# Patient Record
Sex: FEMALE | Race: WHITE | NOT HISPANIC OR LATINO | Employment: UNEMPLOYED | URBAN - METROPOLITAN AREA
[De-identification: names, ages, dates, MRNs, and addresses within clinical notes are randomized per-mention and may not be internally consistent; named-entity substitution may affect disease eponyms.]

---

## 2018-07-09 ENCOUNTER — HOSPITAL ENCOUNTER (INPATIENT)
Facility: MEDICAL CENTER | Age: 28
LOS: 2 days | DRG: 776 | End: 2018-07-11
Attending: OBSTETRICS & GYNECOLOGY | Admitting: OBSTETRICS & GYNECOLOGY
Payer: MEDICAID

## 2018-07-09 LAB
ABO GROUP BLD: NORMAL
BASOPHILS # BLD AUTO: 0.3 % (ref 0–1.8)
BASOPHILS # BLD AUTO: 0.6 % (ref 0–1.8)
BASOPHILS # BLD: 0.04 K/UL (ref 0–0.12)
BASOPHILS # BLD: 0.08 K/UL (ref 0–0.12)
BLD GP AB SCN SERPL QL: NORMAL
EOSINOPHIL # BLD AUTO: 0.07 K/UL (ref 0–0.51)
EOSINOPHIL # BLD AUTO: 0.09 K/UL (ref 0–0.51)
EOSINOPHIL NFR BLD: 0.5 % (ref 0–6.9)
EOSINOPHIL NFR BLD: 0.8 % (ref 0–6.9)
ERYTHROCYTE [DISTWIDTH] IN BLOOD BY AUTOMATED COUNT: 40.2 FL (ref 35.9–50)
ERYTHROCYTE [DISTWIDTH] IN BLOOD BY AUTOMATED COUNT: 40.7 FL (ref 35.9–50)
HBV SURFACE AG SER QL: NEGATIVE
HCT VFR BLD AUTO: 25.8 % (ref 37–47)
HCT VFR BLD AUTO: 27.9 % (ref 37–47)
HCV AB SER QL: REACTIVE
HCV AB SER QL: REACTIVE
HGB BLD-MCNC: 7.9 G/DL (ref 12–16)
HGB BLD-MCNC: 8.4 G/DL (ref 12–16)
HIV 1+2 AB+HIV1 P24 AG SERPL QL IA: NON REACTIVE
IMM GRANULOCYTES # BLD AUTO: 0.06 K/UL (ref 0–0.11)
IMM GRANULOCYTES # BLD AUTO: 0.1 K/UL (ref 0–0.11)
IMM GRANULOCYTES NFR BLD AUTO: 0.5 % (ref 0–0.9)
IMM GRANULOCYTES NFR BLD AUTO: 0.7 % (ref 0–0.9)
LYMPHOCYTES # BLD AUTO: 1.67 K/UL (ref 1–4.8)
LYMPHOCYTES # BLD AUTO: 2.57 K/UL (ref 1–4.8)
LYMPHOCYTES NFR BLD: 12.3 % (ref 22–41)
LYMPHOCYTES NFR BLD: 21.9 % (ref 22–41)
MCH RBC QN AUTO: 20.8 PG (ref 27–33)
MCH RBC QN AUTO: 21.6 PG (ref 27–33)
MCHC RBC AUTO-ENTMCNC: 30.1 G/DL (ref 33.6–35)
MCHC RBC AUTO-ENTMCNC: 30.6 G/DL (ref 33.6–35)
MCV RBC AUTO: 69.2 FL (ref 81.4–97.8)
MCV RBC AUTO: 70.7 FL (ref 81.4–97.8)
MONOCYTES # BLD AUTO: 0.47 K/UL (ref 0–0.85)
MONOCYTES # BLD AUTO: 0.55 K/UL (ref 0–0.85)
MONOCYTES NFR BLD AUTO: 3.5 % (ref 0–13.4)
MONOCYTES NFR BLD AUTO: 4.7 % (ref 0–13.4)
NEUTROPHILS # BLD AUTO: 11.15 K/UL (ref 2–7.15)
NEUTROPHILS # BLD AUTO: 8.42 K/UL (ref 2–7.15)
NEUTROPHILS NFR BLD: 71.8 % (ref 44–72)
NEUTROPHILS NFR BLD: 82.4 % (ref 44–72)
NRBC # BLD AUTO: 0 K/UL
NRBC # BLD AUTO: 0 K/UL
NRBC BLD-RTO: 0 /100 WBC
NRBC BLD-RTO: 0 /100 WBC
PLATELET # BLD AUTO: 174 K/UL (ref 164–446)
PLATELET # BLD AUTO: 181 K/UL (ref 164–446)
PMV BLD AUTO: 10.7 FL (ref 9–12.9)
PMV BLD AUTO: 12.8 FL (ref 9–12.9)
RBC # BLD AUTO: 3.65 M/UL (ref 4.2–5.4)
RBC # BLD AUTO: 4.03 M/UL (ref 4.2–5.4)
RH BLD: NORMAL
RUBV AB SER QL: 13.3 IU/ML
TREPONEMA PALLIDUM IGG+IGM AB [PRESENCE] IN SERUM OR PLASMA BY IMMUNOASSAY: NON REACTIVE
WBC # BLD AUTO: 11.7 K/UL (ref 4.8–10.8)
WBC # BLD AUTO: 13.5 K/UL (ref 4.8–10.8)

## 2018-07-09 PROCEDURE — 86780 TREPONEMA PALLIDUM: CPT

## 2018-07-09 PROCEDURE — 700102 HCHG RX REV CODE 250 W/ 637 OVERRIDE(OP): Performed by: FAMILY MEDICINE

## 2018-07-09 PROCEDURE — A9270 NON-COVERED ITEM OR SERVICE: HCPCS | Performed by: FAMILY MEDICINE

## 2018-07-09 PROCEDURE — 87340 HEPATITIS B SURFACE AG IA: CPT

## 2018-07-09 PROCEDURE — 700111 HCHG RX REV CODE 636 W/ 250 OVERRIDE (IP)

## 2018-07-09 PROCEDURE — 87389 HIV-1 AG W/HIV-1&-2 AB AG IA: CPT

## 2018-07-09 PROCEDURE — 87522 HEPATITIS C REVRS TRNSCRPJ: CPT | Mod: 91

## 2018-07-09 PROCEDURE — 86762 RUBELLA ANTIBODY: CPT

## 2018-07-09 PROCEDURE — 700102 HCHG RX REV CODE 250 W/ 637 OVERRIDE(OP): Performed by: OBSTETRICS & GYNECOLOGY

## 2018-07-09 PROCEDURE — A9270 NON-COVERED ITEM OR SERVICE: HCPCS | Performed by: OBSTETRICS & GYNECOLOGY

## 2018-07-09 PROCEDURE — 0HQ9XZZ REPAIR PERINEUM SKIN, EXTERNAL APPROACH: ICD-10-PCS | Performed by: OBSTETRICS & GYNECOLOGY

## 2018-07-09 PROCEDURE — 85025 COMPLETE CBC W/AUTO DIFF WBC: CPT

## 2018-07-09 PROCEDURE — 36415 COLL VENOUS BLD VENIPUNCTURE: CPT

## 2018-07-09 PROCEDURE — 770002 HCHG ROOM/CARE - OB PRIVATE (112)

## 2018-07-09 PROCEDURE — 88307 TISSUE EXAM BY PATHOLOGIST: CPT

## 2018-07-09 PROCEDURE — 86803 HEPATITIS C AB TEST: CPT | Mod: 91

## 2018-07-09 RX ORDER — LIDOCAINE HYDROCHLORIDE 10 MG/ML
INJECTION, SOLUTION EPIDURAL; INFILTRATION; INTRACAUDAL; PERINEURAL
Status: COMPLETED
Start: 2018-07-09 | End: 2018-07-09

## 2018-07-09 RX ORDER — IBUPROFEN 600 MG/1
600 TABLET ORAL EVERY 6 HOURS PRN
Status: DISCONTINUED | OUTPATIENT
Start: 2018-07-09 | End: 2018-07-11 | Stop reason: HOSPADM

## 2018-07-09 RX ORDER — METHADONE HYDROCHLORIDE 10 MG/ML
112.5 CONCENTRATE ORAL DAILY
Status: DISCONTINUED | OUTPATIENT
Start: 2018-07-09 | End: 2018-07-11 | Stop reason: HOSPADM

## 2018-07-09 RX ORDER — SODIUM CHLORIDE, SODIUM LACTATE, POTASSIUM CHLORIDE, CALCIUM CHLORIDE 600; 310; 30; 20 MG/100ML; MG/100ML; MG/100ML; MG/100ML
INJECTION, SOLUTION INTRAVENOUS PRN
Status: DISCONTINUED | OUTPATIENT
Start: 2018-07-09 | End: 2018-07-11 | Stop reason: HOSPADM

## 2018-07-09 RX ORDER — VITAMIN A ACETATE, BETA CAROTENE, ASCORBIC ACID, CHOLECALCIFEROL, .ALPHA.-TOCOPHEROL ACETATE, DL-, THIAMINE MONONITRATE, RIBOFLAVIN, NIACINAMIDE, PYRIDOXINE HYDROCHLORIDE, FOLIC ACID, CYANOCOBALAMIN, CALCIUM CARBONATE, FERROUS FUMARATE, ZINC OXIDE, CUPRIC OXIDE 3080; 12; 120; 400; 1; 1.84; 3; 20; 22; 920; 25; 200; 27; 10; 2 [IU]/1; UG/1; MG/1; [IU]/1; MG/1; MG/1; MG/1; MG/1; MG/1; [IU]/1; MG/1; MG/1; MG/1; MG/1; MG/1
1 TABLET, FILM COATED ORAL EVERY MORNING
Status: DISCONTINUED | OUTPATIENT
Start: 2018-07-09 | End: 2018-07-11 | Stop reason: HOSPADM

## 2018-07-09 RX ORDER — MISOPROSTOL 200 UG/1
600 TABLET ORAL
Status: DISCONTINUED | OUTPATIENT
Start: 2018-07-09 | End: 2018-07-11 | Stop reason: HOSPADM

## 2018-07-09 RX ORDER — DOCUSATE SODIUM 100 MG/1
100 CAPSULE, LIQUID FILLED ORAL 2 TIMES DAILY PRN
Status: DISCONTINUED | OUTPATIENT
Start: 2018-07-09 | End: 2018-07-11 | Stop reason: HOSPADM

## 2018-07-09 RX ORDER — METHADONE HYDROCHLORIDE 5 MG/1
113 TABLET ORAL DAILY
Status: DISCONTINUED | OUTPATIENT
Start: 2018-07-09 | End: 2018-07-09

## 2018-07-09 RX ADMIN — Medication 1 TABLET: at 11:45

## 2018-07-09 RX ADMIN — LIDOCAINE HYDROCHLORIDE 30 ML: 10 INJECTION, SOLUTION EPIDURAL; INFILTRATION; INTRACAUDAL; PERINEURAL at 09:39

## 2018-07-09 RX ADMIN — IBUPROFEN 600 MG: 600 TABLET, FILM COATED ORAL at 11:44

## 2018-07-09 RX ADMIN — METHADONE HYDROCHLORIDE 112.5 MG: 10 CONCENTRATE ORAL at 10:49

## 2018-07-09 ASSESSMENT — PAIN SCALES - GENERAL
PAINLEVEL_OUTOF10: 2
PAINLEVEL_OUTOF10: 6
PAINLEVEL_OUTOF10: 6
PAINLEVEL_OUTOF10: 2

## 2018-07-09 NOTE — CARE PLAN
Problem: Safety  Goal: Will remain free from injury  Pt educated to use the call light when needing assistance, pt confirmed understanding.     Problem: Venous Thromboembolism (VTW)/Deep Vein Thrombosis (DVT) Prevention:  Goal: Patient will participate in Venous Thrombosis (VTE)/Deep Vein Thrombosis (DVT)Prevention Measures  Pt ambulates.

## 2018-07-09 NOTE — DISCHARGE PLANNING
Anticipated Discharge Disposition: Unknown     Action: LSW received  Referral Form stating Dakota Bartlett with CPS called because he heard MOB gave birth and may be positive for drugs. LSW called Dakota Bartlett, PH: [phone]. CPS taking report and will get it assigned to worker. Infant tested positive for opiates, methadone, and amphetamines. LSW will call CPS after assessment is completed with additional information.    Barriers to Discharge: Waiting for case to be assigned to CPS SW to confirm discharge.    Plan: Assessment on MOB, continue contact with CPS.

## 2018-07-09 NOTE — PROGRESS NOTES
Pt arrived to unit at 1106 via wheelchair.  VSS.  Assessment complete. No signs of distress noted at this time.  Pt reports pain. Resident called and ibuprofen ordered.  Fall precautions and appropriate signs in place. Pt educated regarding fall precautions.  Pt oriented to unit routine, call light/phone system and RN extension number provided.  Pt denies any additional needs at this time.  Call light within reach. Will continue to monitor.

## 2018-07-09 NOTE — PROGRESS NOTES
"0805 - Pt arrived to L&D via [facility] to [address]. Pt states she is a G4, now P3 who didn't know she was pregnant. She states that she \"thought she peed herself\" this am around 0500. Then, at 0600, she started having cramps. At 0704, she realized that she was in labor and called [facility]. The Pt then delivered the baby on her bed at approx 0721, and directly after, [facility] arrived. Per [facility], delivery time of placenta was 0750. Upon arrival, fundus firm at 2 below U, scant bleeding. Vitals stable.   0900 - Dr. Mancini and Dr. Barrett at bedside to assess pt. 1st degree, repaired.   1030 - Pt eating breakfast.   1100 - Pt up to bathroom with steady gait, + void.   1105 - Pt transferred to S [address] via wheelchair in stable condition, infant in arms. Report to ARANZA Souza  "

## 2018-07-09 NOTE — H&P
History and Physical    Jahaira Segundo is a 28 y.o. female  at Unknown who presents for evaluation after home delivery.  No prenatal care.  Reports she didn't know she was pregnant.  Had LOF around 0500, severe abd pain after that, with delivery of male infant at home at 0721.  Placenta delivery in ambulance en route to hospital.  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  Denies any recent use of drugs (2018).      ROS: Pertinent positives documented in HPI and all other systems reviewed & are negative    OB history    .  2 prior SVDs, no issues    PGYNHx: H/O Hep C exposure, h/o chlamydia    Past Medical History:   Diagnosis Date   • ASTHMA    • Seizure (HCC)    • Substance abuse     Past Hx of Heroin       History reviewed. No pertinent surgical history.    No current facility-administered medications for this encounter.   MEDS: Methadone 113 mg PO QD    Allergies: Sulfa drugs    Social History     Social History   • Marital status: Single     Spouse name: N/A   • Number of children: N/A   • Years of education: N/A     Occupational History   • Not on file.     Social History Main Topics   • Smoking status: Current Every Day Smoker     Packs/day: 0.50     Types: Cigarettes     Last attempt to quit: 2016   • Smokeless tobacco: Never Used      Comment: 1/4 PK DAY   • Alcohol use No   • Drug use: Yes     Types: Intravenous      Comment: heroin; none since found out she was pregnant   • Sexual activity: Yes     Partners: Male     Other Topics Concern   • Not on file     Social History Narrative   • No narrative on file         FamHx: DM    Prenatal care NONE:  Patient Active Problem List    Diagnosis Date Noted   • Labor and delivery indication for care or intervention 2016   • Supervision of normal pregnancy in second trimester 2016   • History of recreational drug use- heroin and marijuana. [facility] for [facility]adone maintenance - 85mg daily on   " 07/28/2016         Objective:      Blood pressure 137/90, pulse 96, temperature 36.7 °C (98.1 °F), temperature source Temporal, resp. rate 16, height 1.6 m (5' 3\"), weight 49.9 kg (110 lb), unknown if currently breastfeeding.    General:   cooperative   Skin:   normal and track marks on UE   HEENT:  Neck supple with midline trachea   Lungs:   Normal exam   Heart:   chest is clear without rales or wheezing, no pedal edema, S1, S2 normal, no murmur, regular rate and rhythm   Abdomen:   soft, gravid, NT   Pelvis:  adequate with gynecoid pelvis.  1st degree laceration repaired at bedside with 2-0 and 3-0 vicryl sutures.  1% lidocaine used for anesthesia     Lab Review  Lab:   Blood type: AB     No results found for this or any previous visit (from the past 5880 hour(s)).     Assessment:   Jahaira Segundo at Unknown  Labor status: S/P delivery at home    Obstetrical history significant for   Patient Active Problem List    Diagnosis Date Noted   • Labor and delivery indication for care or intervention 11/19/2016   • Supervision of normal pregnancy in second trimester 07/28/2016   • History of recreational drug use- heroin and marijuana. [facility] for [facility]adone [facility] - 85mg daily on 8/25 07/28/2016   .      Plan:     Admit to L&D  GBS unknown    Repair performed  UDS  Hep C  OB labs  Peds aware of drug use  Placenta to pathology               "

## 2018-07-10 LAB
ALBUMIN SERPL BCP-MCNC: 2.8 G/DL (ref 3.2–4.9)
ALP SERPL-CCNC: 201 U/L (ref 30–99)
ALT SERPL-CCNC: 11 U/L (ref 2–50)
AMPHET UR QL SCN: POSITIVE
AST SERPL-CCNC: 24 U/L (ref 12–45)
BARBITURATES UR QL SCN: NEGATIVE
BENZODIAZ UR QL SCN: NEGATIVE
BILIRUB CONJ SERPL-MCNC: 0.1 MG/DL (ref 0.1–0.5)
BILIRUB INDIRECT SERPL-MCNC: 0.3 MG/DL (ref 0–1)
BILIRUB SERPL-MCNC: 0.4 MG/DL (ref 0.1–1.5)
BZE UR QL SCN: NEGATIVE
CANNABINOIDS UR QL SCN: NEGATIVE
METHADONE UR QL SCN: POSITIVE
OPIATES UR QL SCN: POSITIVE
OXYCODONE UR QL SCN: NEGATIVE
PCP UR QL SCN: NEGATIVE
PROPOXYPH UR QL SCN: NEGATIVE
PROT SERPL-MCNC: 5.7 G/DL (ref 6–8.2)

## 2018-07-10 PROCEDURE — A9270 NON-COVERED ITEM OR SERVICE: HCPCS | Performed by: OBSTETRICS & GYNECOLOGY

## 2018-07-10 PROCEDURE — 770002 HCHG ROOM/CARE - OB PRIVATE (112)

## 2018-07-10 PROCEDURE — 36415 COLL VENOUS BLD VENIPUNCTURE: CPT

## 2018-07-10 PROCEDURE — 700102 HCHG RX REV CODE 250 W/ 637 OVERRIDE(OP): Performed by: FAMILY MEDICINE

## 2018-07-10 PROCEDURE — 700112 HCHG RX REV CODE 229: Performed by: OBSTETRICS & GYNECOLOGY

## 2018-07-10 PROCEDURE — 80307 DRUG TEST PRSMV CHEM ANLYZR: CPT

## 2018-07-10 PROCEDURE — 80076 HEPATIC FUNCTION PANEL: CPT

## 2018-07-10 PROCEDURE — 700102 HCHG RX REV CODE 250 W/ 637 OVERRIDE(OP): Performed by: OBSTETRICS & GYNECOLOGY

## 2018-07-10 PROCEDURE — A9270 NON-COVERED ITEM OR SERVICE: HCPCS | Performed by: NURSE PRACTITIONER

## 2018-07-10 PROCEDURE — A9270 NON-COVERED ITEM OR SERVICE: HCPCS | Performed by: FAMILY MEDICINE

## 2018-07-10 PROCEDURE — 700102 HCHG RX REV CODE 250 W/ 637 OVERRIDE(OP): Performed by: NURSE PRACTITIONER

## 2018-07-10 RX ORDER — FERROUS SULFATE 325(65) MG
325 TABLET ORAL 2 TIMES DAILY WITH MEALS
Status: DISCONTINUED | OUTPATIENT
Start: 2018-07-10 | End: 2018-07-11 | Stop reason: HOSPADM

## 2018-07-10 RX ADMIN — IBUPROFEN 600 MG: 600 TABLET, FILM COATED ORAL at 08:21

## 2018-07-10 RX ADMIN — METHADONE HYDROCHLORIDE 112.5 MG: 10 CONCENTRATE ORAL at 08:47

## 2018-07-10 RX ADMIN — IBUPROFEN 600 MG: 600 TABLET, FILM COATED ORAL at 17:18

## 2018-07-10 RX ADMIN — FERROUS SULFATE TAB 325 MG (65 MG ELEMENTAL FE) 325 MG: 325 (65 FE) TAB at 17:18

## 2018-07-10 RX ADMIN — Medication 1 TABLET: at 08:21

## 2018-07-10 RX ADMIN — FERROUS SULFATE TAB 325 MG (65 MG ELEMENTAL FE) 325 MG: 325 (65 FE) TAB at 08:21

## 2018-07-10 RX ADMIN — DOCUSATE SODIUM 100 MG: 100 CAPSULE, LIQUID FILLED ORAL at 08:21

## 2018-07-10 ASSESSMENT — PAIN SCALES - GENERAL
PAINLEVEL_OUTOF10: 6
PAINLEVEL_OUTOF10: 2
PAINLEVEL_OUTOF10: 2
PAINLEVEL_OUTOF10: 6

## 2018-07-10 NOTE — DISCHARGE PLANNING
"Discharge Planning Assessment Post Partum    Reason for Referral: Infant tested positive for drugs, no prenatal care  Address: [address] [place] [address], [place] [address]   Type of Living Situation: MOB, child and FOB live with MOB parents  Mom Diagnosis: Pregnancy   Baby Diagnosis: Birth  Primary Language: English    Name of Baby: Saturnino Corrigan  Father of the Baby: Eusebio Corrigan  Involved in baby’s care? Yes  Contact Information: [phone]  FOB works as an \"h-vac technician\" at [employer]    Prenatal Care: None, MOB stated she did not know she was pregnant  Mom's PCP: None  PCP for new baby: Dr. Giraldo    Support System: MOB and FOB parents, siblings  Coping/Bonding between mother & baby: Yes  Source of Feeding: Formula  Supplies for Infant: Prepared, MOB stated grandparents are getting what supplies they do not have. Already had crib, car seat, clothing, from prior child.    Mom's Insurance: PFA working on Medicaid  Baby Covered on Insurance: None  Mother Employed/School: None  Other children in the home/names & ages: 1 1/5 year old - Rodolfo Corrigan    Financial Hardship/Income: none   Mom's Mental status: A&OX4  Services used prior to admit: None    CPS History: MOB stated voluntary case last year  Psychiatric History: DX Depression in high school  Domestic Violence History: none  Drug/ETOH History: MOB stated she is going to methadone clinic and has not done drugs for a few months. LSW informed MOB infant tested positive for drugs. MOB stated she was ashamed of using but she didn't know she was pregnant. MOB stated she had relapsed \"last weekend\".    Resources Provided: None  Referrals Made: None     Clearance for Discharge: Discharge clearance pending CPS investigation.    Ongoing Plan: LSW met with MOB at bedside. During assessment, CPS SW, Alena Colon, entered room. LSW to return to provide resources and additional support post interview with CPS.       "

## 2018-07-10 NOTE — PROGRESS NOTES
Assumed care of patient at 1915, received report from day RN at that time. Communication board updated and reviewed with pt. Assessment complete. Pt denies pain at this time. No other needs at this time. Call light and personal belongings within reach.

## 2018-07-10 NOTE — CARE PLAN
Problem: Altered physiologic condition related to immediate post-delivery state and potential for bleeding/hemorrhage  Goal: Patient physiologically stable as evidenced by normal lochia, palpable uterine involution and vital signs within normal limits  Outcome: PROGRESSING AS EXPECTED  Fundus firm, lochia light.     Problem: Alteration in comfort related to episiotomy, vaginal repair and/or after birth pains  Goal: Patient is able to ambulate, care for self and infant  Outcome: PROGRESSING AS EXPECTED  Pt able to ambulate and care for self and infant.

## 2018-07-10 NOTE — CARE PLAN
Problem: Discharge Barriers/Planning  Goal: Patient's continuum of care needs will be met  Awaiting a urine sample from pt.     Problem: Pain Management  Goal: Pain level will decrease to patient's comfort goal  Will medicate for pain PRN see MAR

## 2018-07-10 NOTE — PROGRESS NOTES
0701--Received report from night RN. Infant at bedside in open crib no signs of distress. Pt resting in bed, discussed plan of care and pain management for the day. Pt states she will call staff if she requires pain interventions or assistance through the day. PRN medication administered per MAR. No further needs at this time.

## 2018-07-10 NOTE — PROGRESS NOTES
"Post Partum Progress Note    Name:   Jahaira Segundo   Date/Time:  7/10/2018 - 6:40 AM  Chief Admitting Dx:  Pregnancy  Labor and delivery indication for care or intervention  Delivery Type:  vaginal, spontaneous  Post-Op/Post Partum Days #:  1    Subjective:  Abdominal pain: no  Ambulating:   yes  Tolerating liquids:  yes  Tolerating food:  yes common adult  Flatus:   yes  BM:    no  Bleeding:   with a small amount of bleeding  Voiding:   yes  Dizziness:   no  Feeding:   bottle - Similac with iron    Vitals:    07/09/18 0813 07/09/18 1230 07/09/18 1600 07/09/18 2000   BP: 137/90 116/83 118/79 124/93   Pulse: 96 86 79 88   Resp: 16 18 18 18   Temp: 36.7 °C (98.1 °F) 36.6 °C (97.8 °F) 36.7 °C (98 °F) 36.7 °C (98.1 °F)   TempSrc: Temporal      SpO2:  94% 96% 98%   Weight: 49.9 kg (110 lb)      Height: 1.6 m (5' 3\")          Exam:  Breast: Lactating yes  Abdomen: Abdomen soft, non-tender. BS normal. No masses,  No organomegaly  Fundal Tenderness:  no  Fundus Firm: yes  Incision: none  Below umbilicus: yes  Perineum: perineum intact  Lochia: mild  Extremities: Normal extremities, peripheral pulses and reflexes normal, no edema, redness or tenderness in the calves or thighs    Meds:  Current Facility-Administered Medications   Medication Dose   • LR infusion     • miSOPROStol (CYTOTEC) tablet 600 mcg  600 mcg   • docusate sodium (COLACE) capsule 100 mg  100 mg   • prenatal plus vitamin (STUARTNATAL 1+1) 27-1 MG tablet 1 Tab  1 Tab   • methadone (DOLOPHINE) 10 MG/ML solution 112.5 mg  112.5 mg   • ibuprofen (MOTRIN) tablet 600 mg  600 mg       Labs:   Recent Labs      07/09/18   0944  07/09/18   1551   WBC  13.5*  11.7*   RBC  4.03*  3.65*   HEMOGLOBIN  8.4*  7.9*   HEMATOCRIT  27.9*  25.8*   MCV  69.2*  70.7*   MCH  20.8*  21.6*   MCHC  30.1*  30.6*   RDW  40.2  40.7   PLATELETCT  174  181   MPV  10.7  12.8       Assessment:  Chief Admitting Dx:  Pregnancy  Labor and delivery indication for care or " intervention  Delivery Type:  vaginal, spontaneous  Tubal Ligation:  no    Plan:  Continue routine post partum care.  +Hep C - will order LFTs.   Asx anemia - will start iron  Anticipate DC home on PPD#2    Josey Lindsay C.N.M.

## 2018-07-11 VITALS
HEART RATE: 100 BPM | BODY MASS INDEX: 19.49 KG/M2 | SYSTOLIC BLOOD PRESSURE: 142 MMHG | RESPIRATION RATE: 14 BRPM | WEIGHT: 110 LBS | TEMPERATURE: 98.6 F | DIASTOLIC BLOOD PRESSURE: 74 MMHG | OXYGEN SATURATION: 96 % | HEIGHT: 63 IN

## 2018-07-11 LAB
HCV RNA SERPL NAA+PROBE-ACNC: ABNORMAL IU/ML
HCV RNA SERPL NAA+PROBE-ACNC: ABNORMAL IU/ML
HCV RNA SERPL NAA+PROBE-LOG IU: 6.2 LOG IU
HCV RNA SERPL NAA+PROBE-LOG IU: 6.3 LOG IU
HCV RNA SERPL QL NAA+PROBE: DETECTED
HCV RNA SERPL QL NAA+PROBE: DETECTED
PATHOLOGY STUDY: ABNORMAL
PATHOLOGY STUDY: ABNORMAL

## 2018-07-11 PROCEDURE — 700102 HCHG RX REV CODE 250 W/ 637 OVERRIDE(OP): Performed by: OBSTETRICS & GYNECOLOGY

## 2018-07-11 PROCEDURE — 700102 HCHG RX REV CODE 250 W/ 637 OVERRIDE(OP): Performed by: FAMILY MEDICINE

## 2018-07-11 PROCEDURE — 700102 HCHG RX REV CODE 250 W/ 637 OVERRIDE(OP): Performed by: NURSE PRACTITIONER

## 2018-07-11 PROCEDURE — 700112 HCHG RX REV CODE 229: Performed by: OBSTETRICS & GYNECOLOGY

## 2018-07-11 PROCEDURE — A9270 NON-COVERED ITEM OR SERVICE: HCPCS | Performed by: FAMILY MEDICINE

## 2018-07-11 PROCEDURE — A9270 NON-COVERED ITEM OR SERVICE: HCPCS | Performed by: NURSE PRACTITIONER

## 2018-07-11 PROCEDURE — A9270 NON-COVERED ITEM OR SERVICE: HCPCS | Performed by: OBSTETRICS & GYNECOLOGY

## 2018-07-11 RX ORDER — IBUPROFEN 600 MG/1
600 TABLET ORAL EVERY 6 HOURS PRN
Qty: 30 TAB | Refills: 0 | Status: SHIPPED | OUTPATIENT
Start: 2018-07-11

## 2018-07-11 RX ORDER — FERROUS SULFATE 325(65) MG
325 TABLET ORAL 2 TIMES DAILY WITH MEALS
Qty: 30 TAB | Refills: 1 | Status: SHIPPED | OUTPATIENT
Start: 2018-07-11

## 2018-07-11 RX ADMIN — IBUPROFEN 600 MG: 600 TABLET, FILM COATED ORAL at 08:08

## 2018-07-11 RX ADMIN — METHADONE HYDROCHLORIDE 112.5 MG: 10 CONCENTRATE ORAL at 09:52

## 2018-07-11 RX ADMIN — DOCUSATE SODIUM 100 MG: 100 CAPSULE, LIQUID FILLED ORAL at 08:08

## 2018-07-11 RX ADMIN — FERROUS SULFATE TAB 325 MG (65 MG ELEMENTAL FE) 325 MG: 325 (65 FE) TAB at 08:08

## 2018-07-11 RX ADMIN — Medication 1 TABLET: at 08:08

## 2018-07-11 ASSESSMENT — PAIN SCALES - GENERAL
PAINLEVEL_OUTOF10: 0
PAINLEVEL_OUTOF10: 6
PAINLEVEL_OUTOF10: 5
PAINLEVEL_OUTOF10: 0

## 2018-07-11 NOTE — CARE PLAN
Problem: Communication  Goal: The ability to communicate needs accurately and effectively will improve  Outcome: PROGRESSING AS EXPECTED  Infant in Nursery (monitored), MOB hasn't visited infant and has requested staff to bottle feed infant. Patient has been ambulating well, taking adequate PO fluids and voiding. Refused Pneumonia vaccine.     Problem: Pain Management  Goal: Pain level will decrease to patient's comfort goal  Outcome: PROGRESSING AS EXPECTED  Patient likes to call for PRN pain medication when needed.

## 2018-07-11 NOTE — DISCHARGE SUMMARY
Discharge Summary:      Jahaira Segundo      Admit Date:   2018  Discharge Date:  2018     Admitting diagnosis:  Pregnancy  Labor and delivery indication for care or intervention  Discharge Diagnosis: Status post vaginal, spontaneous.  Pregnancy Complications: no prenatal care, home delivery, methadone use  Tubal Ligation:  no        History:  Past Medical History:   Diagnosis Date   • ASTHMA    • Seizure (HCC)    • Substance abuse     Past Hx of Heroin     OB History    Para Term  AB Living   3 1 1     1   SAB TAB Ectopic Molar Multiple Live Births             1      # Outcome Date GA Lbr Uri/2nd Weight Sex Delivery Anes PTL Lv   3 Current            2 Term 09 41w0d  3.402 kg (7 lb 8 oz) M Vag-Spont  N TANMAY      Birth Comments: Induced   1                     Sulfa drugs  Patient Active Problem List    Diagnosis Date Noted   • Labor and delivery indication for care or intervention 2016   • Supervision of normal pregnancy in second trimester 2016   • History of recreational drug use- heroin and marijuana. [facility]one [facility] - 85mg daily on 2016        Hospital Course:   28 y.o. , now para 2, was admitted with the above mentioned diagnosis, underwent Active Labor, vaginal, spontaneous at home. Patient postpartum course was unremarkable, with progressive advancement in diet , ambulation and toleration of oral analgesia. Patient without complaints today and desires discharge.      Vitals:    18 1600 18 2000 07/10/18 0800 07/10/18 2000   BP: 118/79 124/93 119/88 116/82   Pulse: 79 88 85 75   Resp:    Temp: 36.7 °C (98 °F) 36.7 °C (98.1 °F) 36.6 °C (97.8 °F) 36.8 °C (98.3 °F)   TempSrc:       SpO2: 96% 98% 95% 96%   Weight:       Height:           Current Facility-Administered Medications   Medication Dose   • ferrous sulfate tablet 325 mg  325 mg   • LR infusion     • miSOPROStol (CYTOTEC) tablet  600 mcg  600 mcg   • docusate sodium (COLACE) capsule 100 mg  100 mg   • prenatal plus vitamin (STUARTNATAL 1+1) 27-1 MG tablet 1 Tab  1 Tab   • methadone (DOLOPHINE) 10 MG/ML solution 112.5 mg  112.5 mg   • ibuprofen (MOTRIN) tablet 600 mg  600 mg       Exam:  Breast Exam: negative  Abdomen: Abdomen soft, non-tender. BS normal. No masses,  No organomegaly  Fundus Non Tender: yes  Incision: none  Perineum: perineum intact  Extremity: extremities, peripheral pulses and reflexes normal     Labs:  Recent Labs      07/09/18   0944  07/09/18   1551   WBC  13.5*  11.7*   RBC  4.03*  3.65*   HEMOGLOBIN  8.4*  7.9*   HEMATOCRIT  27.9*  25.8*   MCV  69.2*  70.7*   MCH  20.8*  21.6*   MCHC  30.1*  30.6*   RDW  40.2  40.7   PLATELETCT  174  181   MPV  10.7  12.8        Activity:   Discharge to home  Pelvic Rest x 6 weeks    Assessment:  normal postpartum course  Discharge Assessment: No areas of skin breakdown/redness; surgical incision intact/healing     Follow up: [facility] or [facility] in 5 weeks for vaginal. PO FeSO4 for anemia.      Discharge Meds:   Current Outpatient Prescriptions   Medication Sig Dispense Refill   • ibuprofen (MOTRIN) 600 MG Tab Take 1 Tab by mouth every 6 hours as needed for Mild Pain or Moderate Pain. 30 Tab 0   • ferrous sulfate 325 (65 Fe) MG tablet Take 1 Tab by mouth 2 times a day, with meals. 30 Tab 1       ONDINA Griffin.

## 2018-07-11 NOTE — CARE PLAN
Problem: Altered physiologic condition related to immediate post-delivery state and potential for bleeding/hemorrhage  Goal: Patient physiologically stable as evidenced by normal lochia, palpable uterine involution and vital signs within normal limits  Outcome: PROGRESSING AS EXPECTED  Fundus firm, lochia light.     Problem: Alteration in comfort related to episiotomy, vaginal repair and/or after birth pains  Goal: Patient is able to ambulate, care for self and infant  Outcome: PROGRESSING AS EXPECTED  PRN medication administered per MAR.

## 2018-07-11 NOTE — PROGRESS NOTES
See charting in paper chart, due to system downtime.    Pt discharged at approximately 1300 via wheelchair with hospital escort. Infant staying in NBN under care of NBN RN.  Pt discharge instructions provided at approximately 1200 no prescriptions ordered by MD. No further questions at this time.

## 2018-07-11 NOTE — DISCHARGE INSTRUCTIONS
POSTPARTUM DISCHARGE INSTRUCTIONS FOR MOM    YOB: 1990   Age: 28 y.o.               Admit Date: 2018     Discharge Date: 2018  Attending Doctor:  Mare Ba M.D.                  Allergies:  Sulfa drugs    Discharged to home by car. Discharged via wheelchair, hospital escort: Yes.  Special equipment needed: Not Applicable  Belongings with: Personal  Be sure to schedule a follow-up appointment with your primary care doctor or any specialists as instructed.     Discharge Plan:   Activity Level: Discussed  Confirmed Follow up Appointment: Patient to Call and Schedule Appointment  Confirmed Symptoms Management: Discussed  Medication Reconciliation Updated: Yes  Influenza Vaccine Indication: Patient Refuses    REASONS TO CALL YOUR OBSTETRICIAN:  1.   Persistent fever or shaking chills (Temperature higher than 100.4)  2.   Heavy bleeding (soaking more than 1 pad per hour); Passing clots  3.   Foul odor from vagina  4.   Mastitis (Breast infection; breast pain, chills, fever, redness)  5.   Urinary pain, burning or frequency  6.   Episiotomy infection  7.   Abdominal incision infection  8.   Severe depression longer than 24 hours    HAND WASHING  · Prior to handling the baby.  · Before breastfeeding or bottle feeding baby.  · After using the bathroom or changing the baby's diaper.    WOUND CARE  Ask your physician for additional care instructions.  In general:    ·  Incision:      · Keep clean and dry.    · Do NOT lift anything heavier than your baby for up to 6 weeks.    · There should not be any opening or pus.      VAGINAL CARE  · Nothing inside vagina for 6 weeks: no sexual intercourse, tampons or douching.  · Bleeding may continue for 2-4 weeks.  Amount may vary.    · Call your physician for heavy bleeding which means soaking more than 1 pad per hour    BIRTH CONTROL  · It is possible to become pregnant at any time after delivery and while breastfeeding.  · Plan to discuss a method  "of birth control with your physician at your follow up visit. visit.    DIET AND ELIMINATION  · Eating more fiber (bran cereal, fruits, and vegetables) and drinking plenty of fluids will help to avoid constipation.  · Urinary frequency after childbirth is normal.    POSTPARTUM BLUES  During the first few days after birth, you may experience a sense of the \"blues\" which may include impatience, irritability or even crying.  These feeling come and go quickly.  However, as many as 1 in 10 women experience emotional symptoms known as postpartum depression.    Postpartum depression:  May start as early as the second or third day after delivery or take several weeks or months to develop.  Symptoms of \"blues\" are present, but are more intense:  Crying spells; loss of appetite; feelings of hopelessness or loss of control; fear of touching the baby; over concern or no concern at all about the baby; little or no concern about your own appearance/caring for yourself; and/or inability to sleep or excessive sleeping.  Contact your physician if you are experiencing any of these symptoms.    Crisis Hotline:  · [place] Crisis Hotline:  8-743-BLAIVXY  Or 1-456.936.7710  · [place] Crisis Hotline:  1-950.927.3826  Or 576-807-8951    PREVENTING SHAKEN BABY:  If you are angry or stressed, PUT THE BABY IN THE CRIB, step away, take some deep breaths, and wait until you are calm to care for the baby.  DO NOT SHAKE THE BABY.  You are not alone, call a supporter for help.    · Crisis Call Center 24/7 crisis line 777-041-5759 or 1-244.563.7248  · You can also text them, text \"ANSWER\" to 493303    QUIT SMOKING/TOBACCO USE:  I understand the use of any tobacco products increases my chance of suffering from future heart disease and could cause other illnesses which may shorten my life. Quitting the use of tobacco products is the single most important thing I can do to improve my health. For further information on smoking / tobacco cessation call " a Toll Free Quit Line at 1-507.835.7961 (*[employer] [place]) or 1-231.634.9269 (American Lung Association) or you can access the web based program at www.lungusa.org.    · [place] Tobacco Users Help Line:  (548) 691-2673       Toll Free: 1-283.829.3667  · Quit Tobacco Program [facility] (644)776-0963    DEPRESSION / SUICIDE RISK:  As you are discharged from this [facility], it is important to learn how to keep safe from harming yourself.    Recognize the warning signs:  · Abrupt changes in personality, positive or negative- including increase in energy   · Giving away possessions  · Change in eating patterns- significant weight changes-  positive or negative  · Change in sleeping patterns- unable to sleep or sleeping all the time   · Unwillingness or inability to communicate  · Depression  · Unusual sadness, discouragement and loneliness  · Talk of wanting to die  · Neglect of personal appearance   · Rebelliousness- reckless behavior  · Withdrawal from people/activities they love  · Confusion- inability to concentrate     If you or a loved one observes any of these behaviors or has concerns about self-harm, here's what you can do:  · Talk about it- your feelings and reasons for harming yourself  · Remove any means that you might use to hurt yourself (examples: pills, rope, extension cords, firearm)  · Get professional help from the community (Mental Health, Substance Abuse, psychological counseling)  · Do not be alone:Call your Safe Contact- someone whom you trust who will be there for you.  · Call your local CRISIS HOTLINE 207-4462 or 196-099-6409  · Call your local Children's Mobile Crisis Response Team [place] (319) 583-1371 or www.[employer] Drinks  · Call the toll free National Suicide Prevention Hotlines   · National Suicide Prevention Lifeline 367-221-LQBQ (1444)  · National Hope Line Network 800-SUICIDE (321-8518)    DISCHARGE SURVEY:  Thank you for choosing  [facility].  We hope we provided you with very good care.  You may be receiving a survey in the mail.  Please fill it out.  Your opinion is valuable to us.    ADDITIONAL EDUCATIONAL MATERIALS GIVEN TO PATIENT:        My signature on this form indicates that:  1.  I have reviewed and understand the above information  2.  My questions regarding this information have been answered to my satisfaction.  3.  I have formulated a plan with my discharge nurse to obtain my prescribed medication for home.

## 2018-10-20 ENCOUNTER — HOSPITAL ENCOUNTER (EMERGENCY)
Dept: HOSPITAL 8 - ED | Age: 28
Discharge: HOME | End: 2018-10-20
Payer: MEDICAID

## 2018-10-20 VITALS — SYSTOLIC BLOOD PRESSURE: 119 MMHG | DIASTOLIC BLOOD PRESSURE: 79 MMHG

## 2018-10-20 VITALS — WEIGHT: 100.31 LBS | BODY MASS INDEX: 17.77 KG/M2 | HEIGHT: 63 IN

## 2018-10-20 DIAGNOSIS — F11.10: ICD-10-CM

## 2018-10-20 DIAGNOSIS — L02.413: ICD-10-CM

## 2018-10-20 DIAGNOSIS — L03.113: Primary | ICD-10-CM

## 2018-10-20 LAB
ALBUMIN SERPL-MCNC: 3.1 G/DL (ref 3.4–5)
ALP SERPL-CCNC: 165 U/L (ref 45–117)
ALT SERPL-CCNC: 79 U/L (ref 12–78)
ANION GAP SERPL CALC-SCNC: 5 MMOL/L (ref 5–15)
BASOPHILS # BLD AUTO: 0.03 X10^3/UL (ref 0–0.1)
BASOPHILS NFR BLD AUTO: 1 % (ref 0–1)
BILIRUB SERPL-MCNC: 0.5 MG/DL (ref 0.2–1)
CALCIUM SERPL-MCNC: 8.7 MG/DL (ref 8.5–10.1)
CHLORIDE SERPL-SCNC: 108 MMOL/L (ref 98–107)
CREAT SERPL-MCNC: 0.64 MG/DL (ref 0.55–1.02)
EOSINOPHIL # BLD AUTO: 0.41 X10^3/UL (ref 0–0.4)
EOSINOPHIL NFR BLD AUTO: 7 % (ref 1–7)
ERYTHROCYTE [DISTWIDTH] IN BLOOD BY AUTOMATED COUNT: 18 % (ref 9.6–15.2)
LYMPHOCYTES # BLD AUTO: 1.39 X10^3/UL (ref 1–3.4)
LYMPHOCYTES NFR BLD AUTO: 25 % (ref 22–44)
MCH RBC QN AUTO: 22 PG (ref 27–34.8)
MCHC RBC AUTO-ENTMCNC: 31.4 G/DL (ref 32.4–35.8)
MCV RBC AUTO: 70.1 FL (ref 80–100)
MD: NO
MONOCYTES # BLD AUTO: 0.33 X10^3/UL (ref 0.2–0.8)
MONOCYTES NFR BLD AUTO: 6 % (ref 2–9)
NEUTROPHILS # BLD AUTO: 3.46 X10^3/UL (ref 1.8–6.8)
NEUTROPHILS NFR BLD AUTO: 62 % (ref 42–75)
PLATELET # BLD AUTO: 300 X10^3/UL (ref 130–400)
PMV BLD AUTO: 10.5 FL (ref 7.4–10.4)
PROT SERPL-MCNC: 7.8 G/DL (ref 6.4–8.2)
RBC # BLD AUTO: 5.02 X10^6/UL (ref 3.82–5.3)

## 2018-10-20 PROCEDURE — 83605 ASSAY OF LACTIC ACID: CPT

## 2018-10-20 PROCEDURE — 99285 EMERGENCY DEPT VISIT HI MDM: CPT

## 2018-10-20 PROCEDURE — 85025 COMPLETE CBC W/AUTO DIFF WBC: CPT

## 2018-10-20 PROCEDURE — 90471 IMMUNIZATION ADMIN: CPT

## 2018-10-20 PROCEDURE — 71045 X-RAY EXAM CHEST 1 VIEW: CPT

## 2018-10-20 PROCEDURE — 80053 COMPREHEN METABOLIC PANEL: CPT

## 2018-10-20 PROCEDURE — 36415 COLL VENOUS BLD VENIPUNCTURE: CPT

## 2018-10-20 PROCEDURE — 87040 BLOOD CULTURE FOR BACTERIA: CPT

## 2018-10-20 PROCEDURE — 96374 THER/PROPH/DIAG INJ IV PUSH: CPT

## 2018-10-20 PROCEDURE — 90715 TDAP VACCINE 7 YRS/> IM: CPT

## 2018-10-20 PROCEDURE — 10060 I&D ABSCESS SIMPLE/SINGLE: CPT

## 2022-08-29 ENCOUNTER — APPOINTMENT (OUTPATIENT)
Dept: RADIOLOGY | Facility: MEDICAL CENTER | Age: 32
End: 2022-08-29
Attending: EMERGENCY MEDICINE
Payer: MEDICAID

## 2022-08-29 ENCOUNTER — HOSPITAL ENCOUNTER (EMERGENCY)
Facility: MEDICAL CENTER | Age: 32
End: 2022-08-29
Attending: EMERGENCY MEDICINE
Payer: MEDICAID

## 2022-08-29 VITALS
BODY MASS INDEX: 19.49 KG/M2 | SYSTOLIC BLOOD PRESSURE: 136 MMHG | TEMPERATURE: 97.6 F | HEIGHT: 63 IN | HEART RATE: 98 BPM | DIASTOLIC BLOOD PRESSURE: 88 MMHG | RESPIRATION RATE: 16 BRPM | WEIGHT: 110.01 LBS | OXYGEN SATURATION: 96 %

## 2022-08-29 DIAGNOSIS — Z00.8 MEDICAL CLEARANCE FOR INCARCERATION: ICD-10-CM

## 2022-08-29 DIAGNOSIS — S06.0X1A CONCUSSION WITH LOSS OF CONSCIOUSNESS OF 30 MINUTES OR LESS, INITIAL ENCOUNTER: ICD-10-CM

## 2022-08-29 LAB
ALBUMIN SERPL BCP-MCNC: 4.4 G/DL (ref 3.2–4.9)
ALBUMIN/GLOB SERPL: 1.5 G/DL
ALP SERPL-CCNC: 80 U/L (ref 30–99)
ALT SERPL-CCNC: 22 U/L (ref 2–50)
ANION GAP SERPL CALC-SCNC: 9 MMOL/L (ref 7–16)
APTT PPP: 30.4 SEC (ref 24.7–36)
AST SERPL-CCNC: 34 U/L (ref 12–45)
BASE EXCESS BLDA CALC-SCNC: -3 MMOL/L (ref -4–3)
BASOPHILS # BLD AUTO: 0.8 % (ref 0–1.8)
BASOPHILS # BLD: 0.05 K/UL (ref 0–0.12)
BILIRUB SERPL-MCNC: 0.4 MG/DL (ref 0.1–1.5)
BODY TEMPERATURE: 36.6 CENTIGRADE
BUN SERPL-MCNC: 7 MG/DL (ref 8–22)
CALCIUM SERPL-MCNC: 8.8 MG/DL (ref 8.5–10.5)
CHLORIDE SERPL-SCNC: 106 MMOL/L (ref 96–112)
CO2 SERPL-SCNC: 24 MMOL/L (ref 20–33)
CREAT SERPL-MCNC: 0.53 MG/DL (ref 0.5–1.4)
EOSINOPHIL # BLD AUTO: 0.23 K/UL (ref 0–0.51)
EOSINOPHIL NFR BLD: 3.5 % (ref 0–6.9)
ERYTHROCYTE [DISTWIDTH] IN BLOOD BY AUTOMATED COUNT: 48.2 FL (ref 35.9–50)
GFR SERPLBLD CREATININE-BSD FMLA CKD-EPI: 127 ML/MIN/1.73 M 2
GLOBULIN SER CALC-MCNC: 2.9 G/DL (ref 1.9–3.5)
GLUCOSE SERPL-MCNC: 95 MG/DL (ref 65–99)
HCG SERPL QL: NEGATIVE
HCO3 BLDA-SCNC: 22 MMOL/L (ref 17–25)
HCT VFR BLD AUTO: 32.6 % (ref 37–47)
HGB BLD-MCNC: 9.9 G/DL (ref 12–16)
IMM GRANULOCYTES # BLD AUTO: 0.01 K/UL (ref 0–0.11)
IMM GRANULOCYTES NFR BLD AUTO: 0.2 % (ref 0–0.9)
INR PPP: 1.13 (ref 0.87–1.13)
LACTATE SERPL-SCNC: 0.9 MMOL/L (ref 0.5–2)
LYMPHOCYTES # BLD AUTO: 1.63 K/UL (ref 1–4.8)
LYMPHOCYTES NFR BLD: 25.1 % (ref 22–41)
MCH RBC QN AUTO: 22.7 PG (ref 27–33)
MCHC RBC AUTO-ENTMCNC: 30.4 G/DL (ref 33.6–35)
MCV RBC AUTO: 74.6 FL (ref 81.4–97.8)
MONOCYTES # BLD AUTO: 0.39 K/UL (ref 0–0.85)
MONOCYTES NFR BLD AUTO: 6 % (ref 0–13.4)
NEUTROPHILS # BLD AUTO: 4.19 K/UL (ref 2–7.15)
NEUTROPHILS NFR BLD: 64.4 % (ref 44–72)
NRBC # BLD AUTO: 0 K/UL
NRBC BLD-RTO: 0 /100 WBC
PCO2 BLDA: 38.1 MMHG (ref 26–37)
PH BLDA: 7.38 [PH] (ref 7.4–7.5)
PLATELET # BLD AUTO: 328 K/UL (ref 164–446)
PMV BLD AUTO: 12.1 FL (ref 9–12.9)
PO2 BLDA: 82.5 MMHG (ref 64–87)
POTASSIUM SERPL-SCNC: 3.4 MMOL/L (ref 3.6–5.5)
PROT SERPL-MCNC: 7.3 G/DL (ref 6–8.2)
PROTHROMBIN TIME: 14.4 SEC (ref 12–14.6)
RBC # BLD AUTO: 4.37 M/UL (ref 4.2–5.4)
SAO2 % BLDA: 94.5 % (ref 93–99)
SODIUM SERPL-SCNC: 139 MMOL/L (ref 135–145)
WBC # BLD AUTO: 6.5 K/UL (ref 4.8–10.8)

## 2022-08-29 PROCEDURE — 71260 CT THORAX DX C+: CPT

## 2022-08-29 PROCEDURE — 85025 COMPLETE CBC W/AUTO DIFF WBC: CPT

## 2022-08-29 PROCEDURE — 72128 CT CHEST SPINE W/O DYE: CPT

## 2022-08-29 PROCEDURE — 99284 EMERGENCY DEPT VISIT MOD MDM: CPT

## 2022-08-29 PROCEDURE — 85610 PROTHROMBIN TIME: CPT

## 2022-08-29 PROCEDURE — 85730 THROMBOPLASTIN TIME PARTIAL: CPT

## 2022-08-29 PROCEDURE — 96374 THER/PROPH/DIAG INJ IV PUSH: CPT

## 2022-08-29 PROCEDURE — 72131 CT LUMBAR SPINE W/O DYE: CPT

## 2022-08-29 PROCEDURE — 82803 BLOOD GASES ANY COMBINATION: CPT

## 2022-08-29 PROCEDURE — 83605 ASSAY OF LACTIC ACID: CPT

## 2022-08-29 PROCEDURE — 36415 COLL VENOUS BLD VENIPUNCTURE: CPT

## 2022-08-29 PROCEDURE — 80053 COMPREHEN METABOLIC PANEL: CPT

## 2022-08-29 PROCEDURE — 70450 CT HEAD/BRAIN W/O DYE: CPT

## 2022-08-29 PROCEDURE — 84703 CHORIONIC GONADOTROPIN ASSAY: CPT

## 2022-08-29 PROCEDURE — 72125 CT NECK SPINE W/O DYE: CPT

## 2022-08-29 PROCEDURE — 700111 HCHG RX REV CODE 636 W/ 250 OVERRIDE (IP): Performed by: EMERGENCY MEDICINE

## 2022-08-29 PROCEDURE — 700117 HCHG RX CONTRAST REV CODE 255: Performed by: EMERGENCY MEDICINE

## 2022-08-29 RX ADMIN — FENTANYL CITRATE 50 MCG: 50 INJECTION, SOLUTION INTRAMUSCULAR; INTRAVENOUS at 20:58

## 2022-08-29 RX ADMIN — IOHEXOL 80 ML: 350 INJECTION, SOLUTION INTRAVENOUS at 22:26
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
## 2022-08-30 NOTE — DISCHARGE INSTRUCTIONS
Take Tylenol and Motrin as needed for pain control.    The patient is medically cleared for incarceration
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
## 2023-10-06 ENCOUNTER — HOSPITAL ENCOUNTER (EMERGENCY)
Facility: MEDICAL CENTER | Age: 33
End: 2023-10-06

## 2023-10-06 ENCOUNTER — HOSPITAL ENCOUNTER (EMERGENCY)
Facility: MEDICAL CENTER | Age: 33
End: 2023-10-06
Attending: STUDENT IN AN ORGANIZED HEALTH CARE EDUCATION/TRAINING PROGRAM
Payer: MEDICAID

## 2023-10-06 ENCOUNTER — APPOINTMENT (OUTPATIENT)
Dept: RADIOLOGY | Facility: MEDICAL CENTER | Age: 33
End: 2023-10-06
Attending: STUDENT IN AN ORGANIZED HEALTH CARE EDUCATION/TRAINING PROGRAM
Payer: MEDICAID

## 2023-10-06 VITALS
HEART RATE: 121 BPM | WEIGHT: 130 LBS | HEIGHT: 63 IN | SYSTOLIC BLOOD PRESSURE: 110 MMHG | RESPIRATION RATE: 20 BRPM | BODY MASS INDEX: 23.04 KG/M2 | TEMPERATURE: 98.9 F | OXYGEN SATURATION: 93 % | DIASTOLIC BLOOD PRESSURE: 94 MMHG

## 2023-10-06 DIAGNOSIS — J45.41 MODERATE PERSISTENT ASTHMA WITH ACUTE EXACERBATION: ICD-10-CM

## 2023-10-06 DIAGNOSIS — R00.0 TACHYCARDIA: ICD-10-CM

## 2023-10-06 DIAGNOSIS — E87.6 HYPOKALEMIA: ICD-10-CM

## 2023-10-06 DIAGNOSIS — F15.10 METHAMPHETAMINE USE (HCC): ICD-10-CM

## 2023-10-06 LAB
ALBUMIN SERPL BCP-MCNC: 4.3 G/DL (ref 3.2–4.9)
ALBUMIN/GLOB SERPL: 1.9 G/DL
ALP SERPL-CCNC: 87 U/L (ref 30–99)
ALT SERPL-CCNC: 33 U/L (ref 2–50)
ANION GAP SERPL CALC-SCNC: 11 MMOL/L (ref 7–16)
ANION GAP SERPL CALC-SCNC: 13 MMOL/L (ref 7–16)
AST SERPL-CCNC: 58 U/L (ref 12–45)
B-HCG SERPL-ACNC: <1 MIU/ML (ref 0–5)
BASOPHILS # BLD AUTO: 0.8 % (ref 0–1.8)
BASOPHILS # BLD: 0.08 K/UL (ref 0–0.12)
BILIRUB SERPL-MCNC: 0.5 MG/DL (ref 0.1–1.5)
BUN SERPL-MCNC: 13 MG/DL (ref 8–22)
BUN SERPL-MCNC: 13 MG/DL (ref 8–22)
CALCIUM ALBUM COR SERPL-MCNC: 8.6 MG/DL (ref 8.5–10.5)
CALCIUM SERPL-MCNC: 8.2 MG/DL (ref 8.5–10.5)
CALCIUM SERPL-MCNC: 8.8 MG/DL (ref 8.5–10.5)
CHLORIDE SERPL-SCNC: 102 MMOL/L (ref 96–112)
CHLORIDE SERPL-SCNC: 107 MMOL/L (ref 96–112)
CO2 SERPL-SCNC: 25 MMOL/L (ref 20–33)
CO2 SERPL-SCNC: 27 MMOL/L (ref 20–33)
CREAT SERPL-MCNC: 0.61 MG/DL (ref 0.5–1.4)
CREAT SERPL-MCNC: 0.69 MG/DL (ref 0.5–1.4)
EKG IMPRESSION: NORMAL
EOSINOPHIL # BLD AUTO: 0.44 K/UL (ref 0–0.51)
EOSINOPHIL NFR BLD: 4.7 % (ref 0–6.9)
ERYTHROCYTE [DISTWIDTH] IN BLOOD BY AUTOMATED COUNT: 48.2 FL (ref 35.9–50)
GFR SERPLBLD CREATININE-BSD FMLA CKD-EPI: 119 ML/MIN/1.73 M 2
GFR SERPLBLD CREATININE-BSD FMLA CKD-EPI: 122 ML/MIN/1.73 M 2
GLOBULIN SER CALC-MCNC: 2.3 G/DL (ref 1.9–3.5)
GLUCOSE SERPL-MCNC: 103 MG/DL (ref 65–99)
GLUCOSE SERPL-MCNC: 134 MG/DL (ref 65–99)
HCT VFR BLD AUTO: 41.8 % (ref 37–47)
HGB BLD-MCNC: 14.1 G/DL (ref 12–16)
IMM GRANULOCYTES # BLD AUTO: 0.04 K/UL (ref 0–0.11)
IMM GRANULOCYTES NFR BLD AUTO: 0.4 % (ref 0–0.9)
LYMPHOCYTES # BLD AUTO: 1.21 K/UL (ref 1–4.8)
LYMPHOCYTES NFR BLD: 12.8 % (ref 22–41)
MCH RBC QN AUTO: 28 PG (ref 27–33)
MCHC RBC AUTO-ENTMCNC: 33.7 G/DL (ref 32.2–35.5)
MCV RBC AUTO: 82.9 FL (ref 81.4–97.8)
MONOCYTES # BLD AUTO: 0.34 K/UL (ref 0–0.85)
MONOCYTES NFR BLD AUTO: 3.6 % (ref 0–13.4)
NEUTROPHILS # BLD AUTO: 7.32 K/UL (ref 1.82–7.42)
NEUTROPHILS NFR BLD: 77.7 % (ref 44–72)
NRBC # BLD AUTO: 0 K/UL
NRBC BLD-RTO: 0 /100 WBC (ref 0–0.2)
NT-PROBNP SERPL IA-MCNC: 358 PG/ML (ref 0–125)
PLATELET # BLD AUTO: 312 K/UL (ref 164–446)
PMV BLD AUTO: 10.7 FL (ref 9–12.9)
POTASSIUM SERPL-SCNC: 2.6 MMOL/L (ref 3.6–5.5)
POTASSIUM SERPL-SCNC: 3 MMOL/L (ref 3.6–5.5)
PROT SERPL-MCNC: 6.6 G/DL (ref 6–8.2)
RBC # BLD AUTO: 5.04 M/UL (ref 4.2–5.4)
SODIUM SERPL-SCNC: 142 MMOL/L (ref 135–145)
SODIUM SERPL-SCNC: 143 MMOL/L (ref 135–145)
TROPONIN T SERPL-MCNC: 14 NG/L (ref 6–19)
WBC # BLD AUTO: 9.4 K/UL (ref 4.8–10.8)

## 2023-10-06 PROCEDURE — 700105 HCHG RX REV CODE 258: Mod: UD | Performed by: STUDENT IN AN ORGANIZED HEALTH CARE EDUCATION/TRAINING PROGRAM

## 2023-10-06 PROCEDURE — 36415 COLL VENOUS BLD VENIPUNCTURE: CPT

## 2023-10-06 PROCEDURE — 93005 ELECTROCARDIOGRAM TRACING: CPT | Performed by: STUDENT IN AN ORGANIZED HEALTH CARE EDUCATION/TRAINING PROGRAM

## 2023-10-06 PROCEDURE — 700101 HCHG RX REV CODE 250: Mod: UD | Performed by: STUDENT IN AN ORGANIZED HEALTH CARE EDUCATION/TRAINING PROGRAM

## 2023-10-06 PROCEDURE — 700102 HCHG RX REV CODE 250 W/ 637 OVERRIDE(OP): Mod: UD | Performed by: STUDENT IN AN ORGANIZED HEALTH CARE EDUCATION/TRAINING PROGRAM

## 2023-10-06 PROCEDURE — 84702 CHORIONIC GONADOTROPIN TEST: CPT

## 2023-10-06 PROCEDURE — 85025 COMPLETE CBC W/AUTO DIFF WBC: CPT

## 2023-10-06 PROCEDURE — 96375 TX/PRO/DX INJ NEW DRUG ADDON: CPT

## 2023-10-06 PROCEDURE — 99285 EMERGENCY DEPT VISIT HI MDM: CPT

## 2023-10-06 PROCEDURE — 700111 HCHG RX REV CODE 636 W/ 250 OVERRIDE (IP): Mod: UD | Performed by: STUDENT IN AN ORGANIZED HEALTH CARE EDUCATION/TRAINING PROGRAM

## 2023-10-06 PROCEDURE — 80048 BASIC METABOLIC PNL TOTAL CA: CPT

## 2023-10-06 PROCEDURE — 83880 ASSAY OF NATRIURETIC PEPTIDE: CPT

## 2023-10-06 PROCEDURE — 94640 AIRWAY INHALATION TREATMENT: CPT

## 2023-10-06 PROCEDURE — A9270 NON-COVERED ITEM OR SERVICE: HCPCS | Mod: UD | Performed by: STUDENT IN AN ORGANIZED HEALTH CARE EDUCATION/TRAINING PROGRAM

## 2023-10-06 PROCEDURE — 71045 X-RAY EXAM CHEST 1 VIEW: CPT

## 2023-10-06 PROCEDURE — 80053 COMPREHEN METABOLIC PANEL: CPT

## 2023-10-06 PROCEDURE — 84484 ASSAY OF TROPONIN QUANT: CPT

## 2023-10-06 PROCEDURE — 96365 THER/PROPH/DIAG IV INF INIT: CPT

## 2023-10-06 PROCEDURE — 96366 THER/PROPH/DIAG IV INF ADDON: CPT

## 2023-10-06 RX ORDER — ALBUTEROL SULFATE 90 UG/1
2 AEROSOL, METERED RESPIRATORY (INHALATION) EVERY 6 HOURS PRN
Qty: 8.5 G | Refills: 0 | Status: SHIPPED | OUTPATIENT
Start: 2023-10-06

## 2023-10-06 RX ORDER — POTASSIUM CHLORIDE 7.45 MG/ML
10 INJECTION INTRAVENOUS
Status: COMPLETED | OUTPATIENT
Start: 2023-10-06 | End: 2023-10-06

## 2023-10-06 RX ORDER — PREDNISONE 20 MG/1
40 TABLET ORAL ONCE
Status: COMPLETED | OUTPATIENT
Start: 2023-10-06 | End: 2023-10-06

## 2023-10-06 RX ORDER — DIAZEPAM 5 MG/ML
5 INJECTION, SOLUTION INTRAMUSCULAR; INTRAVENOUS ONCE
Status: COMPLETED | OUTPATIENT
Start: 2023-10-06 | End: 2023-10-06

## 2023-10-06 RX ORDER — PREDNISONE 20 MG/1
40 TABLET ORAL DAILY
Qty: 8 TABLET | Refills: 0 | Status: SHIPPED | OUTPATIENT
Start: 2023-10-06 | End: 2023-10-10

## 2023-10-06 RX ORDER — SODIUM CHLORIDE, SODIUM LACTATE, POTASSIUM CHLORIDE, CALCIUM CHLORIDE 600; 310; 30; 20 MG/100ML; MG/100ML; MG/100ML; MG/100ML
1000 INJECTION, SOLUTION INTRAVENOUS ONCE
Status: COMPLETED | OUTPATIENT
Start: 2023-10-06 | End: 2023-10-06

## 2023-10-06 RX ORDER — LORAZEPAM 1 MG/1
1 TABLET ORAL ONCE
Status: COMPLETED | OUTPATIENT
Start: 2023-10-06 | End: 2023-10-06

## 2023-10-06 RX ORDER — POTASSIUM CHLORIDE 20 MEQ/1
40 TABLET, EXTENDED RELEASE ORAL ONCE
Status: COMPLETED | OUTPATIENT
Start: 2023-10-06 | End: 2023-10-06

## 2023-10-06 RX ORDER — IPRATROPIUM BROMIDE AND ALBUTEROL SULFATE 2.5; .5 MG/3ML; MG/3ML
3 SOLUTION RESPIRATORY (INHALATION) ONCE
Status: COMPLETED | OUTPATIENT
Start: 2023-10-06 | End: 2023-10-06

## 2023-10-06 RX ADMIN — POTASSIUM CHLORIDE 10 MEQ: 7.46 INJECTION, SOLUTION INTRAVENOUS at 03:43

## 2023-10-06 RX ADMIN — SODIUM CHLORIDE, POTASSIUM CHLORIDE, SODIUM LACTATE AND CALCIUM CHLORIDE 1000 ML: 600; 310; 30; 20 INJECTION, SOLUTION INTRAVENOUS at 05:03

## 2023-10-06 RX ADMIN — SODIUM CHLORIDE, POTASSIUM CHLORIDE, SODIUM LACTATE AND CALCIUM CHLORIDE 1000 ML: 600; 310; 30; 20 INJECTION, SOLUTION INTRAVENOUS at 02:52

## 2023-10-06 RX ADMIN — DIAZEPAM 5 MG: 5 INJECTION, SOLUTION INTRAMUSCULAR; INTRAVENOUS at 04:56

## 2023-10-06 RX ADMIN — LORAZEPAM 1 MG: 1 TABLET ORAL at 01:58

## 2023-10-06 RX ADMIN — PREDNISONE 40 MG: 20 TABLET ORAL at 01:58

## 2023-10-06 RX ADMIN — POTASSIUM CHLORIDE 40 MEQ: 1500 TABLET, EXTENDED RELEASE ORAL at 03:26

## 2023-10-06 RX ADMIN — POTASSIUM CHLORIDE 10 MEQ: 7.46 INJECTION, SOLUTION INTRAVENOUS at 05:08

## 2023-10-06 RX ADMIN — POTASSIUM CHLORIDE 40 MEQ: 1500 TABLET, EXTENDED RELEASE ORAL at 07:31

## 2023-10-06 RX ADMIN — IPRATROPIUM BROMIDE AND ALBUTEROL SULFATE 3 ML: 2.5; .5 SOLUTION RESPIRATORY (INHALATION) at 02:00

## 2023-10-06 ASSESSMENT — FIBROSIS 4 INDEX: FIB4 SCORE: 0.69

## 2023-10-06 NOTE — ED NOTES
Rounded on patient. Patient resting in gurney with unlabored respirations. No safety risk noted.  Continued safety precaution. Gurney in low position, side rail up for pt safety. Patient is very fidgety, but remains calm. Paper and pen provided per patient request.

## 2023-10-06 NOTE — ED TRIAGE NOTES
Chief Complaint   Patient presents with    Shortness of Breath     Pt was BIB EMS. Patient reports that she smoked methamphetamine this evening and developed shortness of breath. Patient then called EMS for assistance.        Patient roomed in green 30 and placed on monitors. 80% on RA. Placed on 3L to maintain >90% SPO2. Patient states she still feels SOB, but otherwise in no pain. Patient has HX of asthma and an allergy to sulfa drugs. Patient received 1 albuterol treatment, 2 Duo-Neb treatments and 125 of solu-medrol PTA. Patient AOX4, ambulatory and comfortable at this time.

## 2023-10-06 NOTE — ED NOTES
Rounded on patient. Patient resting with even and unlabored respirations. No safety risk noted at this time. Blood drawn and sent to lab.

## 2023-10-06 NOTE — ED NOTES
Bedside report received from off going RN/tech: Nahun, assumed care of patient.  POC discussed with patient. Call light within reach, all needs addressed at this time.       Fall risk interventions in place: Not Applicable (all applicable per [place] Fall risk assessment)   Continuous monitoring: Cardiac Leads, Pulse Ox, or Blood Pressure  IVF/IV medications: Not Applicable   Oxygen: Room Air  Bedside sitter: Not Applicable   Isolation: Not Applicable
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
